# Patient Record
Sex: FEMALE | Race: WHITE | Employment: OTHER | URBAN - NONMETROPOLITAN AREA
[De-identification: names, ages, dates, MRNs, and addresses within clinical notes are randomized per-mention and may not be internally consistent; named-entity substitution may affect disease eponyms.]

---

## 2022-12-21 ENCOUNTER — HOSPITAL ENCOUNTER (EMERGENCY)
Age: 72
Discharge: HOME | End: 2022-12-21
Payer: MEDICARE

## 2022-12-21 VITALS
HEIGHT: 62 IN | TEMPERATURE: 98.4 F | WEIGHT: 117 LBS | SYSTOLIC BLOOD PRESSURE: 159 MMHG | BODY MASS INDEX: 21.53 KG/M2 | DIASTOLIC BLOOD PRESSURE: 96 MMHG | RESPIRATION RATE: 16 BRPM | OXYGEN SATURATION: 100 % | HEART RATE: 75 BPM

## 2022-12-21 DIAGNOSIS — U07.1 COVID-19: Primary | ICD-10-CM

## 2022-12-21 PROCEDURE — 99203 OFFICE O/P NEW LOW 30 MIN: CPT

## 2022-12-21 RX ORDER — NIRMATRELVIR AND RITONAVIR 300-100 MG
KIT ORAL
Qty: 30 TABLET | Refills: 0 | Status: SHIPPED | OUTPATIENT
Start: 2022-12-21 | End: 2022-12-26

## 2022-12-21 RX ORDER — ALBUTEROL SULFATE 90 UG/1
2 AEROSOL, METERED RESPIRATORY (INHALATION) EVERY 4 HOURS PRN
Qty: 54 G | Refills: 0 | Status: SHIPPED | OUTPATIENT
Start: 2022-12-21

## 2022-12-21 RX ORDER — ENALAPRIL MALEATE 5 MG/1
TABLET ORAL
COMMUNITY
Start: 2022-11-18

## 2022-12-21 RX ORDER — GUAIFENESIN AND DEXTROMETHORPHAN HYDROBROMIDE 1200; 60 MG/1; MG/1
1 TABLET, EXTENDED RELEASE ORAL 2 TIMES DAILY
Qty: 14 TABLET | Refills: 0 | Status: SHIPPED | OUTPATIENT
Start: 2022-12-21

## 2022-12-21 RX ORDER — CITALOPRAM 10 MG/1
10 TABLET ORAL DAILY
COMMUNITY

## 2022-12-21 ASSESSMENT — PAIN - FUNCTIONAL ASSESSMENT: PAIN_FUNCTIONAL_ASSESSMENT: NONE - DENIES PAIN

## 2022-12-21 ASSESSMENT — ENCOUNTER SYMPTOMS: COUGH: 1

## 2022-12-22 NOTE — ED PROVIDER NOTES
[facility]  Urgent Care Encounter      CHIEF COMPLAINT       Chief Complaint   Patient presents with    Concern For COVID-19       Nurses Notes reviewed and I agree except as noted in the HPI. HISTORY OF PRESENT ILLNESS   Heidi Parada is a 81 y.o. female who presents to urgent care for evaluation of COVID-19. Patient had a positive home COVID test today after developing nasal congestion and generalized feeling of unwell yesterday. She has a cough, but is a smoker. Denies fever, but endorses chills. No GI symptoms. She does not have a pulse ox at home. She has a family friend with her that confirmed the positive test.  Patient's  had COVID last week and received Paxlovid. REVIEW OF SYSTEMS     Review of Systems   Constitutional:  Positive for chills and fatigue. HENT:  Positive for congestion. Respiratory:  Positive for cough. All other systems reviewed and are negative. PAST MEDICAL HISTORY   No past medical history on file. SURGICAL HISTORY     Patient  has no past surgical history on file. CURRENT MEDICATIONS       Discharge Medication List as of 12/21/2022  7:31 PM        CONTINUE these medications which have NOT CHANGED    Details   citalopram (CELEXA) 10 MG tablet Take 10 mg by mouth dailyHistorical Med      NONFORMULARY Indications: 5mg blood pressure pillHistorical Med      enalapril (VASOTEC) 5 MG tablet TAKE 1 TABLET BY MOUTH ONCE DAILYHistorical Med             ALLERGIES     Patient is has No Known Allergies. FAMILY HISTORY     Patient'sfamily history is not on file. SOCIAL HISTORY     Patient      PHYSICAL EXAM     ED TRIAGE VITALS  BP: (!) 159/96, Temp: 98.4 °F (36.9 °C), Heart Rate: 75, Resp: 16, SpO2: 100 %  Physical Exam  Vitals reviewed. Constitutional:       General: She is not in acute distress. Appearance: She is well-developed. HENT:      Head: Normocephalic.       Right Ear: External ear normal.      Left Ear: External ear normal.      Nose: Nose normal.      Mouth/Throat:      Pharynx: No oropharyngeal exudate. Eyes:      Conjunctiva/sclera: Conjunctivae normal.      Pupils: Pupils are equal, round, and reactive to light. Neck:      Thyroid: No thyromegaly. Cardiovascular:      Rate and Rhythm: Normal rate and regular rhythm. Heart sounds: Normal heart sounds. No murmur heard. No friction rub. No gallop. Pulmonary:      Effort: Pulmonary effort is normal. No respiratory distress. Breath sounds: Examination of the right-upper field reveals wheezing. Examination of the right-lower field reveals wheezing. Wheezing present. No rales. Abdominal:      General: There is no distension. Palpations: Abdomen is soft. There is no mass. Tenderness: There is no abdominal tenderness. There is no guarding. Musculoskeletal:         General: Normal range of motion. Cervical back: Normal range of motion and neck supple. Lymphadenopathy:      Cervical: No cervical adenopathy. Skin:     General: Skin is warm and dry. Capillary Refill: Capillary refill takes less than 2 seconds. Neurological:      Mental Status: She is alert and oriented to person, place, and time. Psychiatric:         Behavior: Behavior normal.         Thought Content: Thought content normal.         Judgment: Judgment normal.       DIAGNOSTIC RESULTS   Labs:No results found for this visit on 12/21/22. IMAGING:  No orders to display      URGENT CARE COURSE:     Vitals:    12/21/22 1905   BP: (!) 159/96   Pulse: 75   Resp: 16   Temp: 98.4 °F (36.9 °C)   TempSrc: Temporal   SpO2: 100%   Weight: 117 lb (53.1 kg)   Height: 5' 2\" (1.575 m)       Medications - No data to display  PROCEDURES:  None  FINAL IMPRESSION      1. COVID-19        DISPOSITION/PLAN   DISPOSITION Decision To Discharge 12/21/2022 07:30:32 PM    Patient nontoxic in no distress.   Discussed use of antiviral.  Will treat with Mucinex DM and albuterol inhaler for some wheezing on the right side. Discussed symptoms that should prompt evaluation in the emergency department, while as isolation recommendations. PATIENT REFERRED TO:  Todd Hines MD  [address]  [phone]    In 1 week  As needed. Go to ER for worsening symptoms, shortness of breath, or signs of dehydration. DISCHARGE MEDICATIONS:  Discharge Medication List as of 12/21/2022  7:31 PM        START taking these medications    Details   nirmatrelvir/ritonavir (PAXLOVID, 300/100,) 20 x 150 MG & 10 x 100MG TBPK Take 3 tablets (two 150 mg nirmatrelvir and one 100 mg ritonavir tablets) by mouth every 12 hours for 5 days. , Disp-30 tablet, R-0Normal      albuterol sulfate HFA (VENTOLIN HFA) 108 (90 Base) MCG/ACT inhaler Inhale 2 puffs into the lungs every 4 hours as needed for Wheezing, Disp-54 g, R-0Normal      Dextromethorphan-guaiFENesin  MG TB12 Take 1 tablet by mouth 2 times daily, Disp-14 tablet, R-0Normal           Discharge Medication List as of 12/21/2022  7:31 PM            LIANA Richey CNP, APRN - CNP  12/21/22 1954

## 2022-12-22 NOTE — ED TRIAGE NOTES
Patient ambulated to room and states she is positive for Covid from home test. States her  was positive last week and she would like the same medication that he had received. Patient unsure of medication name.  Patient's friend in room
Patient refused to answer remaining assessment questions regarding history because she was concerned her pharmacy would close before her discharge
Normal rate, regular rhythm.  Heart sounds S1, S2.  No murmurs, rubs or gallops.